# Patient Record
Sex: FEMALE | Race: WHITE | Employment: FULL TIME | URBAN - METROPOLITAN AREA
[De-identification: names, ages, dates, MRNs, and addresses within clinical notes are randomized per-mention and may not be internally consistent; named-entity substitution may affect disease eponyms.]

---

## 2020-02-12 ENCOUNTER — HOSPITAL ENCOUNTER (EMERGENCY)
Age: 35
Discharge: HOME | End: 2020-02-12
Attending: EMERGENCY MEDICINE

## 2020-02-12 VITALS
BODY MASS INDEX: 21.72 KG/M2 | WEIGHT: 127.21 LBS | DIASTOLIC BLOOD PRESSURE: 89 MMHG | SYSTOLIC BLOOD PRESSURE: 138 MMHG | TEMPERATURE: 98.7 F | OXYGEN SATURATION: 100 % | RESPIRATION RATE: 16 BRPM | HEART RATE: 93 BPM | HEIGHT: 64 IN

## 2020-02-12 LAB
BACTERIA WET PREP: NORMAL
BILIRUBIN URINE: NEGATIVE
BLOOD, URINE: NEGATIVE
CLARITY: CLEAR
CLUE CELLS: NORMAL
COLOR: YELLOW
EPITHELIAL CELLS WET PREP: NORMAL
GLUCOSE URINE: NEGATIVE MG/DL
HCG(URINE) PREGNANCY TEST: NEGATIVE
KETONES, URINE: NEGATIVE MG/DL
LEUKOCYTE ESTERASE, URINE: NEGATIVE
MICROSCOPIC EXAMINATION: NORMAL
NITRITE, URINE: NEGATIVE
PH UA: 7 (ref 5–8)
PROTEIN UA: NEGATIVE MG/DL
RBC WET PREP: NORMAL
SOURCE WET PREP: NORMAL
SPECIFIC GRAVITY UA: 1.02 (ref 1–1.03)
TRICHOMONAS PREP: NORMAL
URINE REFLEX TO CULTURE: NORMAL
URINE TYPE: NORMAL
UROBILINOGEN, URINE: 0.2 E.U./DL
WBC WET PREP: NORMAL
YEAST WET PREP: NORMAL

## 2020-02-12 PROCEDURE — 87591 N.GONORRHOEAE DNA AMP PROB: CPT

## 2020-02-12 PROCEDURE — 81003 URINALYSIS AUTO W/O SCOPE: CPT

## 2020-02-12 PROCEDURE — 6360000002 HC RX W HCPCS: Performed by: EMERGENCY MEDICINE

## 2020-02-12 PROCEDURE — 96372 THER/PROPH/DIAG INJ SC/IM: CPT

## 2020-02-12 PROCEDURE — 87491 CHLMYD TRACH DNA AMP PROBE: CPT

## 2020-02-12 PROCEDURE — 6370000000 HC RX 637 (ALT 250 FOR IP): Performed by: EMERGENCY MEDICINE

## 2020-02-12 PROCEDURE — 99283 EMERGENCY DEPT VISIT LOW MDM: CPT

## 2020-02-12 PROCEDURE — 87210 SMEAR WET MOUNT SALINE/INK: CPT

## 2020-02-12 PROCEDURE — 84703 CHORIONIC GONADOTROPIN ASSAY: CPT

## 2020-02-12 RX ORDER — PHENAZOPYRIDINE HYDROCHLORIDE 95 MG/1
95 TABLET ORAL 3 TIMES DAILY PRN
Qty: 20 TABLET | Refills: 0 | Status: SHIPPED | OUTPATIENT
Start: 2020-02-12 | End: 2020-02-19

## 2020-02-12 RX ORDER — CEFTRIAXONE SODIUM 250 MG/1
250 INJECTION, POWDER, FOR SOLUTION INTRAMUSCULAR; INTRAVENOUS ONCE
Status: COMPLETED | OUTPATIENT
Start: 2020-02-12 | End: 2020-02-12

## 2020-02-12 RX ORDER — ONDANSETRON 4 MG/1
4 TABLET, ORALLY DISINTEGRATING ORAL ONCE
Status: COMPLETED | OUTPATIENT
Start: 2020-02-12 | End: 2020-02-12

## 2020-02-12 RX ORDER — METRONIDAZOLE 500 MG/1
2000 TABLET ORAL ONCE
Status: COMPLETED | OUTPATIENT
Start: 2020-02-12 | End: 2020-02-12

## 2020-02-12 RX ORDER — AZITHROMYCIN 500 MG/1
1000 TABLET, FILM COATED ORAL ONCE
Status: COMPLETED | OUTPATIENT
Start: 2020-02-12 | End: 2020-02-12

## 2020-02-12 RX ADMIN — CEFTRIAXONE SODIUM 250 MG: 250 INJECTION, POWDER, FOR SOLUTION INTRAMUSCULAR; INTRAVENOUS at 20:22

## 2020-02-12 RX ADMIN — METRONIDAZOLE 2000 MG: 500 TABLET ORAL at 20:22

## 2020-02-12 RX ADMIN — ONDANSETRON 4 MG: 4 TABLET, ORALLY DISINTEGRATING ORAL at 20:23

## 2020-02-12 RX ADMIN — AZITHROMYCIN MONOHYDRATE 1000 MG: 500 TABLET ORAL at 20:22

## 2020-02-12 ASSESSMENT — ENCOUNTER SYMPTOMS
VOMITING: 0
ABDOMINAL PAIN: 0
NAUSEA: 0
BACK PAIN: 0

## 2020-02-13 LAB
C TRACH DNA GENITAL QL NAA+PROBE: NEGATIVE
N. GONORRHOEAE DNA: NEGATIVE

## 2020-02-13 NOTE — ED NOTES
Discharge and education instructions reviewed. Patient instructed to follow-up as noted - return to emergency department if symptoms worsen. Patient verbalized understanding. Patient denied questions at this time. No acute distress noted. Discharged per provider with discharge instructions.           [employer], RN  02/12/20 [id number]

## 2020-02-13 NOTE — ED PROVIDER NOTES
[id number] [facility]  eMERGENCY dEPARTMENTeNCOUnter      Pt Name: Natividad Treadwell  MRN: [id number]  [place] 1985  Date ofevaluation: 2/12/2020  Provider: Temi Laughlin MD    CHIEF COMPLAINT       Chief Complaint   Patient presents with    Dysuria     burning with urination x2days, with noticible odor, blood in urine. HISTORY OF PRESENT ILLNESS   (Location/Symptom, Timing/Onset,Context/Setting, Quality, Duration, Modifying Factors, Severity)  Note limiting factors. Natividad Treadwell is a 36 y.o. female who presents to the emergency department for evaluation of dysuria and vaginal discharge. Patient states that her symptoms of been ongoing for the past 2 days. Denies hematuria abdominal pain fevers nausea or vomiting. Patient states that she currently has a present sex one partner but is concerned about possible STI exposure. States she is currently taking oral birth control and that her last period was approximately 1-1/2 months ago. Denies any history of STIs has had UTIs in the past.     HPI    NursingNotes were reviewed. REVIEW OF SYSTEMS    (2-9 systems for level 4, 10 or more for level 5)     Review of Systems   Constitutional: Negative for chills, diaphoresis and fever. Cardiovascular: Negative for chest pain. Gastrointestinal: Negative for abdominal pain, nausea and vomiting. Genitourinary: Positive for dysuria and vaginal discharge. Negative for decreased urine volume, flank pain, genital sores, vaginal bleeding and vaginal pain. Musculoskeletal: Negative for arthralgias and back pain. Skin: Negative for rash and wound. All other systems reviewed and are negative. Except as noted above the remainder of the review of systems was reviewed and negative. PAST MEDICAL HISTORY   History reviewed. No pertinent past medical history. SURGICALHISTORY     History reviewed. No pertinent surgical history.       CURRENT MEDICATIONS       Previous Medications    IBUPROFEN (ADVIL;MOTRIN) 800 MG TABLET    Take 800 mg by mouth every 6 hours as needed. ALLERGIES     Patient has no known allergies. FAMILY HISTORY     History reviewed. No pertinent family history. SOCIAL HISTORY       Social History     Socioeconomic History    Marital status: Single     Spouse name: None    Number of children: None    Years of education: None    Highest education level: None   Occupational History    None   Social Needs    Financial resource strain: None    Food insecurity:     Worry: None     Inability: None    Transportation needs:     Medical: None     Non-medical: None   Tobacco Use    Smoking status: Current Every Day Smoker    Smokeless tobacco: Never Used   Substance and Sexual Activity    Alcohol use: Yes     Comment: socially    Drug use: No    Sexual activity: Yes     Partners: Male   Lifestyle    Physical activity:     Days per week: None     Minutes per session: None    Stress: None   Relationships    Social connections:     Talks on phone: None     Gets together: None     Attends [employer] service: None     Active member of club or organization: None     Attends meetings of clubs or organizations: None     Relationship status: None    Intimate partner violence:     Fear of current or ex partner: None     Emotionally abused: None     Physically abused: None     Forced sexual activity: None   Other Topics Concern    None   Social History Narrative    None       SCREENINGS      @FLOW([id number])@      PHYSICAL EXAM    (up to 7 for level 4, 8 or more for level 5)     ED Triage Vitals [02/12/20 1902]   BP Temp Temp Source Pulse Resp SpO2 Height Weight   138/89 98.7 °F (37.1 °C) Oral 93 16 100 % 5' 4\" (1.626 m) 127 lb 3.3 oz (57.7 kg)       Physical Exam  Vitals signs and nursing note reviewed. Constitutional:       Appearance: She is well-developed. HENT:      Head: Normocephalic and atraumatic.    Eyes:      Conjunctiva/sclera: Abdomen soft nontender nondistended. Patient is well-appearing. The patient is having pain and has no constitutional symptoms she was allowed to swab herself. Patient was amenable to empiric treatment. Based on the patient's results they were informed that they should inform sexual partners that they need testing and treatment for possible STI. The patient was also informed that they are at risk for concurrent infection of HIV, HSV, and HPV and should be tested. Patient advised to refrain from sexual intercourse until further testing could be performed. REASSESSMENT      Reevaluation patient is resting comfortably. Urinalysis did not appear to be infected. Wet prep did not show signs of BV candidiasis or trichomonas. Patient was treated empirically for gonorrhea and chlamydia. Due to the patient's symptoms I did discuss empiric treatment for trichomonas and she was agreeable. Patient informed to abstain from intercourse in the next 7 to 10 days or until her symptoms resolved. Results were discussed with the patient and why it was of the opinion that the patient was suitable for discharge. Patient is amenable to discharge home. Return indications discussed with the patient. Patient demonstrates understanding of when to return for reevaluation for persistent or worsening symptoms. Eliecer Odonnell CRITICAL CARE TIME   Total Critical Care time was 0 minutes, excluding separatelyreportable procedures. There was a high probability ofclinically significant/life threatening deterioration in the patient's condition which required my urgent intervention. CONSULTS:  None    PROCEDURES:  Unless otherwise noted below, none     Procedures    FINAL IMPRESSION      1. Dysuria          DISPOSITION/PLAN   DISPOSITION        PATIENT REFERREDTO:  No follow-up provider specified.     DISCHARGEMEDICATIONS:  New Prescriptions    No medications on file          (Please note that portions of this note were completed with a

## 2021-08-04 ENCOUNTER — APPOINTMENT (OUTPATIENT)
Dept: ULTRASOUND IMAGING | Age: 36
End: 2021-08-04

## 2021-08-04 ENCOUNTER — HOSPITAL ENCOUNTER (EMERGENCY)
Age: 36
Discharge: HOME | End: 2021-08-04

## 2021-08-04 VITALS
TEMPERATURE: 98.6 F | WEIGHT: 101.63 LBS | BODY MASS INDEX: 16.93 KG/M2 | DIASTOLIC BLOOD PRESSURE: 59 MMHG | HEIGHT: 65 IN | RESPIRATION RATE: 16 BRPM | SYSTOLIC BLOOD PRESSURE: 116 MMHG | OXYGEN SATURATION: 100 % | HEART RATE: 70 BPM

## 2021-08-04 DIAGNOSIS — K83.8 BILIARY SLUDGE DETERMINED BY ULTRASOUND: ICD-10-CM

## 2021-08-04 DIAGNOSIS — R11.2 NON-INTRACTABLE VOMITING WITH NAUSEA, UNSPECIFIED VOMITING TYPE: Primary | ICD-10-CM

## 2021-08-04 LAB
A/G RATIO: 1.7 (ref 1.1–2.2)
ALBUMIN SERPL-MCNC: 5.2 G/DL (ref 3.4–5)
ALP BLD-CCNC: 59 U/L (ref 40–129)
ALT SERPL-CCNC: 71 U/L (ref 10–40)
ANION GAP SERPL CALCULATED.3IONS-SCNC: 15 MMOL/L (ref 3–16)
AST SERPL-CCNC: 43 U/L (ref 15–37)
BASOPHILS ABSOLUTE: 0.1 K/UL (ref 0–0.2)
BASOPHILS RELATIVE PERCENT: 0.6 %
BILIRUB SERPL-MCNC: 1.4 MG/DL (ref 0–1)
BUN BLDV-MCNC: 11 MG/DL (ref 7–20)
CALCIUM SERPL-MCNC: 10.3 MG/DL (ref 8.3–10.6)
CHLORIDE BLD-SCNC: 93 MMOL/L (ref 99–110)
CO2: 23 MMOL/L (ref 21–32)
CREAT SERPL-MCNC: 0.7 MG/DL (ref 0.6–1.1)
EOSINOPHILS ABSOLUTE: 0 K/UL (ref 0–0.6)
EOSINOPHILS RELATIVE PERCENT: 0.2 %
GFR AFRICAN AMERICAN: >60
GFR NON-AFRICAN AMERICAN: >60
GLOBULIN: 3.1 G/DL
GLUCOSE BLD-MCNC: 119 MG/DL (ref 70–99)
HCG QUALITATIVE: NEGATIVE
HCT VFR BLD CALC: 47.8 % (ref 36–48)
HEMOGLOBIN: 16.1 G/DL (ref 12–16)
LIPASE: 31 U/L (ref 13–60)
LYMPHOCYTES ABSOLUTE: 1.2 K/UL (ref 1–5.1)
LYMPHOCYTES RELATIVE PERCENT: 13.2 %
MCH RBC QN AUTO: 32.3 PG (ref 26–34)
MCHC RBC AUTO-ENTMCNC: 33.8 G/DL (ref 31–36)
MCV RBC AUTO: 95.6 FL (ref 80–100)
MONOCYTES ABSOLUTE: 0.5 K/UL (ref 0–1.3)
MONOCYTES RELATIVE PERCENT: 5.3 %
NEUTROPHILS ABSOLUTE: 7.1 K/UL (ref 1.7–7.7)
NEUTROPHILS RELATIVE PERCENT: 80.7 %
PDW BLD-RTO: 13.2 % (ref 12.4–15.4)
PLATELET # BLD: 252 K/UL (ref 135–450)
PMV BLD AUTO: 9.2 FL (ref 5–10.5)
POTASSIUM SERPL-SCNC: 3.9 MMOL/L (ref 3.5–5.1)
RBC # BLD: 5 M/UL (ref 4–5.2)
SARS-COV-2, NAAT: NOT DETECTED
SODIUM BLD-SCNC: 131 MMOL/L (ref 136–145)
TOTAL PROTEIN: 8.3 G/DL (ref 6.4–8.2)
TSH REFLEX: 1.76 UIU/ML (ref 0.27–4.2)
WBC # BLD: 8.8 K/UL (ref 4–11)

## 2021-08-04 PROCEDURE — 80053 COMPREHEN METABOLIC PANEL: CPT

## 2021-08-04 PROCEDURE — 99284 EMERGENCY DEPT VISIT MOD MDM: CPT

## 2021-08-04 PROCEDURE — 6360000002 HC RX W HCPCS: Performed by: PHYSICIAN ASSISTANT

## 2021-08-04 PROCEDURE — 83690 ASSAY OF LIPASE: CPT

## 2021-08-04 PROCEDURE — 36415 COLL VENOUS BLD VENIPUNCTURE: CPT

## 2021-08-04 PROCEDURE — 96374 THER/PROPH/DIAG INJ IV PUSH: CPT

## 2021-08-04 PROCEDURE — 87635 SARS-COV-2 COVID-19 AMP PRB: CPT

## 2021-08-04 PROCEDURE — 2500000003 HC RX 250 WO HCPCS: Performed by: PHYSICIAN ASSISTANT

## 2021-08-04 PROCEDURE — 2580000003 HC RX 258: Performed by: PHYSICIAN ASSISTANT

## 2021-08-04 PROCEDURE — 84703 CHORIONIC GONADOTROPIN ASSAY: CPT

## 2021-08-04 PROCEDURE — 84443 ASSAY THYROID STIM HORMONE: CPT

## 2021-08-04 PROCEDURE — 76705 ECHO EXAM OF ABDOMEN: CPT

## 2021-08-04 PROCEDURE — 96375 TX/PRO/DX INJ NEW DRUG ADDON: CPT

## 2021-08-04 PROCEDURE — 85025 COMPLETE CBC W/AUTO DIFF WBC: CPT

## 2021-08-04 RX ORDER — DICYCLOMINE HYDROCHLORIDE 10 MG/1
10 CAPSULE ORAL EVERY 6 HOURS PRN
Qty: 20 CAPSULE | Refills: 0 | Status: SHIPPED | OUTPATIENT
Start: 2021-08-04

## 2021-08-04 RX ORDER — HALOPERIDOL 5 MG/ML
2 INJECTION INTRAMUSCULAR ONCE
Status: COMPLETED | OUTPATIENT
Start: 2021-08-04 | End: 2021-08-04

## 2021-08-04 RX ORDER — PANTOPRAZOLE SODIUM 20 MG/1
20 TABLET, DELAYED RELEASE ORAL DAILY
Qty: 30 TABLET | Refills: 0 | Status: SHIPPED | OUTPATIENT
Start: 2021-08-04

## 2021-08-04 RX ORDER — KETOROLAC TROMETHAMINE 30 MG/ML
15 INJECTION, SOLUTION INTRAMUSCULAR; INTRAVENOUS ONCE
Status: COMPLETED | OUTPATIENT
Start: 2021-08-04 | End: 2021-08-04

## 2021-08-04 RX ORDER — ONDANSETRON 4 MG/1
4 TABLET, ORALLY DISINTEGRATING ORAL EVERY 8 HOURS PRN
Qty: 20 TABLET | Refills: 0 | Status: SHIPPED | OUTPATIENT
Start: 2021-08-04

## 2021-08-04 RX ORDER — 0.9 % SODIUM CHLORIDE 0.9 %
2000 INTRAVENOUS SOLUTION INTRAVENOUS ONCE
Status: COMPLETED | OUTPATIENT
Start: 2021-08-04 | End: 2021-08-04

## 2021-08-04 RX ORDER — METOCLOPRAMIDE 10 MG/1
10 TABLET ORAL 4 TIMES DAILY
Qty: 120 TABLET | Refills: 0 | Status: SHIPPED | OUTPATIENT
Start: 2021-08-04

## 2021-08-04 RX ORDER — ONDANSETRON 2 MG/ML
4 INJECTION INTRAMUSCULAR; INTRAVENOUS ONCE
Status: COMPLETED | OUTPATIENT
Start: 2021-08-04 | End: 2021-08-04

## 2021-08-04 RX ADMIN — HALOPERIDOL LACTATE 2 MG: 5 INJECTION, SOLUTION INTRAMUSCULAR at 17:05

## 2021-08-04 RX ADMIN — KETOROLAC TROMETHAMINE 15 MG: 30 INJECTION, SOLUTION INTRAMUSCULAR; INTRAVENOUS at 16:03

## 2021-08-04 RX ADMIN — SODIUM CHLORIDE 2000 ML: 9 INJECTION, SOLUTION INTRAVENOUS at 15:08

## 2021-08-04 RX ADMIN — FAMOTIDINE 20 MG: 10 INJECTION, SOLUTION INTRAVENOUS at 15:11

## 2021-08-04 RX ADMIN — ONDANSETRON 4 MG: 2 INJECTION INTRAMUSCULAR; INTRAVENOUS at 15:12

## 2021-08-04 ASSESSMENT — ENCOUNTER SYMPTOMS
ABDOMINAL PAIN: 1
CHEST TIGHTNESS: 0
SHORTNESS OF BREATH: 0
NAUSEA: 1
DIARRHEA: 1
VOMITING: 1

## 2021-08-04 ASSESSMENT — PAIN SCALES - GENERAL
PAINLEVEL_OUTOF10: 7
PAINLEVEL_OUTOF10: 7
PAINLEVEL_OUTOF10: 5

## 2021-08-04 ASSESSMENT — PAIN DESCRIPTION - LOCATION
LOCATION: ABDOMEN
LOCATION: ABDOMEN

## 2021-08-04 NOTE — ED PROVIDER NOTES
[address]  [address] [address]  Dept: [phone]  Loc: [phone]  eMERGENCYdEPARTMENT eNCOUnter      Pt Name: Haylie Perkins  MRN: [id number]  [place] 1985  Date of evaluation: 8/4/2021  Provider:Kaley Stewart PA-C    CHIEF COMPLAINT       Chief Complaint   Patient presents with    Emesis     pt states that on Sunday she started having nausea and vomiting. pt states that this has happened to her before where she is unable to keep anything down but patient is now running a fever. Pt. concerned for COVID. CRITICAL CARE TIME   Total Critical Care time was 0 minutes, excluding separately reportable procedures. There was a high probability of clinically significant/life threatening deterioration in the patient's condition which required my urgentintervention. HISTORY OF PRESENT ILLNESS  (Location/Symptom, Timing/Onset, Context/Setting, Quality, Duration,Modifying Factors, Severity.)   Haylie Perkins is a 29 y.o. female who presents to the emergency department by private vehicle complaining of nausea, vomiting, diarrhea and chills. Patient states symptoms began Sunday evening. She has had persistent vomiting since. Patient unable to keep down any food or liquid. States she has not urinated in over 24 hours. She is concern for dehydration. Denies any coffee-ground masses or hematemesis. Had diarrhea the first day, none since. Has had chills and subjective fevers at home. No recorded fevers. States she has pain throughout her mid to upper abdomen. Pain described as aching in sensation. Pain rated as 5/10 severity. Patient rates pain to uncontrolled vomiting. Denies any upper respiratory complaints, myalgias/arthralgias, cough, urinary symptoms. Patient's had similar symptoms previously that led to 2 previous ED visits. Following 1 visit she was diagnosed with peptic ulcer disease.   She states this was several years ago, not currently on any antiacid/PPI treatment. Nursing Notes were reviewedand agreed with or any disagreements were addressed in the HPI. REVIEW OF SYSTEMS    (2-9 systems for level 4, 10 or more for level 5)     Review of Systems   Constitutional: Positive for chills. Negative for fever. HENT: Negative. Respiratory: Negative for chest tightness and shortness of breath. Cardiovascular: Negative. Gastrointestinal: Positive for abdominal pain, diarrhea, nausea and vomiting. Genitourinary: Negative. Musculoskeletal: Negative for arthralgias and myalgias. Skin: Negative. Neurological: Negative for dizziness and light-headedness. Psychiatric/Behavioral: Negative for behavioral problems and confusion. Except as noted above the remainder of the review of systems was reviewed and negative. PAST MEDICAL HISTORY   History reviewed. No pertinent past medical history. SURGICAL HISTORY     History reviewed. No pertinent surgical history. CURRENT MEDICATIONS     [unfilled]    ALLERGIES     Patient has no known allergies. FAMILY HISTORY     History reviewed. No pertinent family history. No family status information on file. SOCIAL HISTORY      reports that she has been smoking. She has never used smokeless tobacco. She reports current alcohol use. She reports that she does not use drugs. PHYSICAL EXAM    (up to 7 for level 4, 8 or more for level 5)     ED Triage Vitals   Enc Vitals Group      BP 08/04/21 1220 130/85      Pulse 08/04/21 1220 84      Resp 08/04/21 1220 14      Temp 08/04/21 1220 98.9 °F (37.2 °C)      Temp Source 08/04/21 1220 Temporal      SpO2 08/04/21 1220 99 %      Weight 08/04/21 1220 102 lb 8.2 oz (46.5 kg)      Height 08/04/21 1403 5' 5\" (1.651 m)      Head Circumference --       Peak Flow --       Pain Score --       Pain Loc --       Pain Edu? --       Excl.  in [address]? --         Physical Exam  Constitutional: Appearance: Normal appearance. HENT:      Head: Normocephalic and atraumatic. Cardiovascular:      Rate and Rhythm: Normal rate and regular rhythm. Pulmonary:      Effort: Pulmonary effort is normal.      Breath sounds: Normal breath sounds. Abdominal:      Palpations: Abdomen is soft. Tenderness: There is abdominal tenderness (Generalized mild TTP). There is no guarding or rebound. Musculoskeletal:         General: Normal range of motion. Cervical back: Normal range of motion and neck supple. Skin:     General: Skin is warm. Coloration: Skin is not jaundiced. Neurological:      General: No focal deficit present. Mental Status: She is alert and oriented to person, place, and time. Psychiatric:         Mood and Affect: Mood normal.         Behavior: Behavior normal.           DIAGNOSTIC RESULTS     EKG: All EKG's are interpreted by the Emergency Department Physician who either signs or Co-signs this chart in the absence of a cardiologist.    RADIOLOGY:   Non-plain film images such as CT, Ultrasound and MRI are read by the radiologist. Plain radiographic images are visualized and preliminarilyinterpreted by the emergency physician with the below findings:    Interpretation per the Radiologist below,if available at the time of this note:    [address]   Final Result   1. Gallbladder sludge without evidence of acute cholecystitis.                LABS:  Labs Reviewed   CBC WITH AUTO DIFFERENTIAL - Abnormal; Notable for the following components:       Result Value    Hemoglobin 16.1 (*)     All other components within normal limits    Narrative:     Performed at:  [address] [address]   Phone [phone]   COMPREHENSIVE METABOLIC PANEL - Abnormal; Notable for the following components:    Sodium 131 (*)     Chloride 93 (*)     Glucose 119 (*)     Total Protein 8.3 (*)     Albumin 5.2 (*)     Total Bilirubin 1.4 Pepcid. At reevaluation no significant relief. IV Toradol given. No significant relief. Patient given 2 mg IV Haldol this time. At reevaluation patient voiced relief of nausea and pain. She is able to tolerate oral p.o. at this time. Labs obtained as above. Pertinent findings include no leukocytosis/leukopenia. Hemoglobin elevated, likely hemoconcentration from dehydration. CMP showed mild hyponatremia the sodium 131, no other significant electrolyte abnormality. No renal impairment. Patient mild elevation of bilirubin at 1.4, mild elevation of AST/ALT as above. Lipase normal.     Given pain worsens with eating she had some episodes previously. Considered biliary colic. Gallbladder ultrasound obtained showed sludge without evidence of acute cholecystitis. After further discussion with patient pain when present predominate periumbilical region. She has no focal tenderness epigastric right upper quadrant. 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 Will provide GI referral.  Patient also provide contact number for general surgery if unable to be seen by GI and reasonable time as well as PCP. Given benign abdomen at final evaluation, location of pain, history and no leukocytosis after discussion with patient we did hold CT scan at this time. Patient educated on return precautions. She voiced understanding. They were also relayed in discharge instructions. She is comfortable with plan. She was discharged home in stable condition. The patient tolerated their visit well. I saw the patient independently with physician available for consultation as needed. I have discussed the findings of today's workup with the patient and addressed the patient's questions and concerns. Important warning signs as well as new or worsening symptoms which would necessitate immediate return to the ED were discussed.  The plan is to discharge